# Patient Record
Sex: MALE | Race: BLACK OR AFRICAN AMERICAN | NOT HISPANIC OR LATINO | Employment: FULL TIME | URBAN - METROPOLITAN AREA
[De-identification: names, ages, dates, MRNs, and addresses within clinical notes are randomized per-mention and may not be internally consistent; named-entity substitution may affect disease eponyms.]

---

## 2021-07-08 ENCOUNTER — APPOINTMENT (OUTPATIENT)
Dept: GENERAL RADIOLOGY | Facility: HOSPITAL | Age: 66
End: 2021-07-08

## 2021-07-08 PROCEDURE — 73560 X-RAY EXAM OF KNEE 1 OR 2: CPT | Performed by: FAMILY MEDICINE

## 2024-12-23 ENCOUNTER — HOSPITAL ENCOUNTER (EMERGENCY)
Facility: HOSPITAL | Age: 69
Discharge: HOME | End: 2024-12-23
Attending: EMERGENCY MEDICINE | Admitting: EMERGENCY MEDICINE
Payer: MEDICARE

## 2024-12-23 ENCOUNTER — APPOINTMENT (OUTPATIENT)
Dept: CT IMAGING | Facility: HOSPITAL | Age: 69
End: 2024-12-23
Payer: MEDICARE

## 2024-12-23 VITALS
OXYGEN SATURATION: 94 % | RESPIRATION RATE: 16 BRPM | WEIGHT: 185 LBS | HEIGHT: 66 IN | DIASTOLIC BLOOD PRESSURE: 91 MMHG | HEART RATE: 61 BPM | BODY MASS INDEX: 29.73 KG/M2 | SYSTOLIC BLOOD PRESSURE: 166 MMHG | TEMPERATURE: 97.2 F

## 2024-12-23 DIAGNOSIS — R42 DIZZY: Primary | ICD-10-CM

## 2024-12-23 DIAGNOSIS — R42 VERTIGO: ICD-10-CM

## 2024-12-23 LAB
ALBUMIN SERPL-MCNC: 4.5 G/DL (ref 3.5–5.2)
ALBUMIN/GLOB SERPL: 1.2 G/DL
ALP SERPL-CCNC: 88 U/L (ref 39–117)
ALT SERPL W P-5'-P-CCNC: 31 U/L (ref 1–41)
ANION GAP SERPL CALCULATED.3IONS-SCNC: 9.1 MMOL/L (ref 5–15)
AST SERPL-CCNC: 17 U/L (ref 1–40)
BASOPHILS # BLD AUTO: 0.02 10*3/MM3 (ref 0–0.2)
BASOPHILS NFR BLD AUTO: 0.2 % (ref 0–1.5)
BILIRUB SERPL-MCNC: 0.4 MG/DL (ref 0–1.2)
BUN SERPL-MCNC: 17 MG/DL (ref 8–23)
BUN/CREAT SERPL: 15.9 (ref 7–25)
CALCIUM SPEC-SCNC: 9.4 MG/DL (ref 8.6–10.5)
CHLORIDE SERPL-SCNC: 103 MMOL/L (ref 98–107)
CO2 SERPL-SCNC: 26.9 MMOL/L (ref 22–29)
CREAT SERPL-MCNC: 1.07 MG/DL (ref 0.76–1.27)
D DIMER PPP FEU-MCNC: 0.34 MCGFEU/ML (ref 0–0.7)
DEPRECATED RDW RBC AUTO: 39.9 FL (ref 37–54)
EGFRCR SERPLBLD CKD-EPI 2021: 74.7 ML/MIN/1.73
EOSINOPHIL # BLD AUTO: 0.04 10*3/MM3 (ref 0–0.4)
EOSINOPHIL NFR BLD AUTO: 0.3 % (ref 0.3–6.2)
ERYTHROCYTE [DISTWIDTH] IN BLOOD BY AUTOMATED COUNT: 11.7 % (ref 12.3–15.4)
GEN 5 1HR TROPONIN T REFLEX: 9 NG/L
GLOBULIN UR ELPH-MCNC: 3.8 GM/DL
GLUCOSE SERPL-MCNC: 141 MG/DL (ref 65–99)
HCT VFR BLD AUTO: 53.3 % (ref 37.5–51)
HGB BLD-MCNC: 16.6 G/DL (ref 13–17.7)
IMM GRANULOCYTES # BLD AUTO: 0.02 10*3/MM3 (ref 0–0.05)
IMM GRANULOCYTES NFR BLD AUTO: 0.2 % (ref 0–0.5)
LYMPHOCYTES # BLD AUTO: 1.77 10*3/MM3 (ref 0.7–3.1)
LYMPHOCYTES NFR BLD AUTO: 14.4 % (ref 19.6–45.3)
MCH RBC QN AUTO: 28.7 PG (ref 26.6–33)
MCHC RBC AUTO-ENTMCNC: 31.1 G/DL (ref 31.5–35.7)
MCV RBC AUTO: 92.2 FL (ref 79–97)
MONOCYTES # BLD AUTO: 0.64 10*3/MM3 (ref 0.1–0.9)
MONOCYTES NFR BLD AUTO: 5.2 % (ref 5–12)
NEUTROPHILS NFR BLD AUTO: 79.7 % (ref 42.7–76)
NEUTROPHILS NFR BLD AUTO: 9.78 10*3/MM3 (ref 1.7–7)
PLATELET # BLD AUTO: 233 10*3/MM3 (ref 140–450)
PMV BLD AUTO: 11.4 FL (ref 6–12)
POTASSIUM SERPL-SCNC: 3.8 MMOL/L (ref 3.5–5.2)
PROT SERPL-MCNC: 8.3 G/DL (ref 6–8.5)
QT INTERVAL: 408 MS
QTC INTERVAL: 411 MS
RBC # BLD AUTO: 5.78 10*6/MM3 (ref 4.14–5.8)
SODIUM SERPL-SCNC: 139 MMOL/L (ref 136–145)
TROPONIN T NUMERIC DELTA: 1 NG/L
TROPONIN T SERPL HS-MCNC: 8 NG/L
WBC NRBC COR # BLD AUTO: 12.27 10*3/MM3 (ref 3.4–10.8)

## 2024-12-23 PROCEDURE — 85025 COMPLETE CBC W/AUTO DIFF WBC: CPT

## 2024-12-23 PROCEDURE — 80053 COMPREHEN METABOLIC PANEL: CPT | Performed by: EMERGENCY MEDICINE

## 2024-12-23 PROCEDURE — 96374 THER/PROPH/DIAG INJ IV PUSH: CPT

## 2024-12-23 PROCEDURE — 85379 FIBRIN DEGRADATION QUANT: CPT | Performed by: EMERGENCY MEDICINE

## 2024-12-23 PROCEDURE — 70450 CT HEAD/BRAIN W/O DYE: CPT

## 2024-12-23 PROCEDURE — 25010000002 ONDANSETRON PER 1 MG: Performed by: EMERGENCY MEDICINE

## 2024-12-23 PROCEDURE — 84484 ASSAY OF TROPONIN QUANT: CPT | Performed by: EMERGENCY MEDICINE

## 2024-12-23 PROCEDURE — 93005 ELECTROCARDIOGRAM TRACING: CPT | Performed by: EMERGENCY MEDICINE

## 2024-12-23 PROCEDURE — 36415 COLL VENOUS BLD VENIPUNCTURE: CPT

## 2024-12-23 PROCEDURE — 99284 EMERGENCY DEPT VISIT MOD MDM: CPT

## 2024-12-23 RX ORDER — ONDANSETRON 4 MG/1
4 TABLET, ORALLY DISINTEGRATING ORAL EVERY 6 HOURS PRN
Qty: 20 TABLET | Refills: 0 | Status: SHIPPED | OUTPATIENT
Start: 2024-12-23

## 2024-12-23 RX ORDER — MECLIZINE HYDROCHLORIDE 25 MG/1
25 TABLET ORAL EVERY 6 HOURS PRN
Qty: 30 TABLET | Refills: 0 | Status: SHIPPED | OUTPATIENT
Start: 2024-12-23

## 2024-12-23 RX ORDER — MECLIZINE HYDROCHLORIDE 25 MG/1
25 TABLET ORAL ONCE
Status: COMPLETED | OUTPATIENT
Start: 2024-12-23 | End: 2024-12-23

## 2024-12-23 RX ORDER — DEXAMETHASONE 4 MG/1
4 TABLET ORAL
Qty: 5 TABLET | Refills: 0 | Status: SHIPPED | OUTPATIENT
Start: 2024-12-23 | End: 2024-12-28

## 2024-12-23 RX ORDER — ONDANSETRON 2 MG/ML
4 INJECTION INTRAMUSCULAR; INTRAVENOUS ONCE
Status: COMPLETED | OUTPATIENT
Start: 2024-12-23 | End: 2024-12-23

## 2024-12-23 RX ADMIN — MECLIZINE HYDROCHLORIDE 25 MG: 25 TABLET ORAL at 12:26

## 2024-12-23 RX ADMIN — ONDANSETRON 4 MG: 2 INJECTION, SOLUTION INTRAMUSCULAR; INTRAVENOUS at 12:26

## 2024-12-23 NOTE — DISCHARGE INSTRUCTIONS
Today the CAT scan of your head does not reveal any evidence of a stroke.    Your laboratory evaluation likewise is reassuring with no evidence of a heart attack or any other acute abnormality    I do think you have vertigo.  I did send in appropriate medicine that should help improve your symptoms    Return at any time should your symptoms acutely worsen or do not improve    Please read all of the instructions in this handout.  If you receive prescriptions please fill them and take them as directed.  Please call your primary care physician for follow-up appointment in the next 5 to 7 days.  If you do not have a physician you may call the [facility] referral line at [phone].    You may return to the emergency department at any time for any concerns such as worsening symptoms.  If you received a work or school note it will be printed at the back of this packet.

## 2024-12-23 NOTE — [facility] PROVIDER NOTE
EMERGENCY DEPARTMENT ENCOUNTER    Room Number:  01/01  Date seen:  12/23/2024  Time seen: 12:21 EST  PCP: Kristian Winn MD  Historian:     Discussed/obtained information from independent historians:     HPI:    The patient is a 69-year-old male.  He went to bed last night at 0130 in normal state of health.  He woke this morning around 5:30 AM with symptoms of vertigo.  Specifically states the room was spinning.  Some nausea.  No headache, no focal motor or sensory deficits in his extremities.  Gait is intact.  No trauma.  No associated respiratory symptoms    External (non-ED) record review:        Chronic or social conditions impacting care:    ALLERGIES  Shellfish-derived products    PAST MEDICAL HISTORY  Active Ambulatory Problems     Diagnosis Date Noted    No Active Ambulatory Problems     Resolved Ambulatory Problems     Diagnosis Date Noted    No Resolved Ambulatory Problems     Past Medical History:   Diagnosis Date    Asthma        PAST SURGICAL HISTORY  History reviewed. No pertinent surgical history.    FAMILY HISTORY  Family History   Problem Relation Age of Onset    Diabetes Father     Glaucoma Other        SOCIAL HISTORY  Social History     Socioeconomic History    Marital status: Single   Tobacco Use    Smoking status: Never   Substance and Sexual Activity    Alcohol use: Never       REVIEW OF SYSTEMS  Review of Systems    All systems reviewed and negative except for those discussed in HPI.       PHYSICAL EXAM    I have reviewed the triage vital signs and nursing notes.  Vitals:    12/23/24 1312   BP: 158/95   Pulse: 69   Resp: 16   Temp:    SpO2: 97%     Physical Exam    Vital signs: Reviewed in nurses notes    General: Patient is awake alert no acute distress    HEENT: Normocephalic atraumatic nasopharynx is clear.  Oropharynx is clear and moist    Neck:   Supple, no elevation JVD, no bruits noted on either side    Respiratory:   Nonlabored respirations.  Clear to auscultation  bilaterally.  Equal breath sounds bilaterally.  No wheezes or stridor noted.    Cardiovascular: Regular rate and rhythm.  No pretibial or pedal edema    Abdomen: Soft nondistended nontender    Skin:   Warm and dry.  No rashes noted    Neurological examination: Patient is awake alert oriented x 4.  Speech is intact and normal.  No facial palsy.  5 out of 5 strength x 4 extremities.  Normal finger-to-nose bilaterally.  Gait is intact and normal.  NIH equals 0  LAB RESULTS  Recent Results (from the past 24 hours)   D-dimer, Quantitative    Collection Time: 12/23/24 11:53 AM    Specimen: Blood   Result Value Ref Range    D-Dimer, Quantitative 0.34 0.00 - 0.70 MCGFEU/mL   Comprehensive Metabolic Panel    Collection Time: 12/23/24 11:53 AM    Specimen: Blood   Result Value Ref Range    Glucose 141 (H) 65 - 99 mg/dL    BUN 17 8 - 23 mg/dL    Creatinine 1.07 0.76 - 1.27 mg/dL    Sodium 139 136 - 145 mmol/L    Potassium 3.8 3.5 - 5.2 mmol/L    Chloride 103 98 - 107 mmol/L    CO2 26.9 22.0 - 29.0 mmol/L    Calcium 9.4 8.6 - 10.5 mg/dL    Total Protein 8.3 6.0 - 8.5 g/dL    Albumin 4.5 3.5 - 5.2 g/dL    ALT (SGPT) 31 1 - 41 U/L    AST (SGOT) 17 1 - 40 U/L    Alkaline Phosphatase 88 39 - 117 U/L    Total Bilirubin 0.4 0.0 - 1.2 mg/dL    Globulin 3.8 gm/dL    A/G Ratio 1.2 g/dL    BUN/Creatinine Ratio 15.9 7.0 - 25.0    Anion Gap 9.1 5.0 - 15.0 mmol/L    eGFR 74.7 >60.0 mL/min/1.73   CBC Auto Differential    Collection Time: 12/23/24 11:53 AM    Specimen: Blood   Result Value Ref Range    WBC 12.27 (H) 3.40 - 10.80 10*3/mm3    RBC 5.78 4.14 - 5.80 10*6/mm3    Hemoglobin 16.6 13.0 - 17.7 g/dL    Hematocrit 53.3 (H) 37.5 - 51.0 %    MCV 92.2 79.0 - 97.0 fL    MCH 28.7 26.6 - 33.0 pg    MCHC 31.1 (L) 31.5 - 35.7 g/dL    RDW 11.7 (L) 12.3 - 15.4 %    RDW-SD 39.9 37.0 - 54.0 fl    MPV 11.4 6.0 - 12.0 fL    Platelets 233 140 - 450 10*3/mm3    Neutrophil % 79.7 (H) 42.7 - 76.0 %    Lymphocyte % 14.4 (L) 19.6 - 45.3 %    Monocyte % 5.2  5.0 - 12.0 %    Eosinophil % 0.3 0.3 - 6.2 %    Basophil % 0.2 0.0 - 1.5 %    Immature Grans % 0.2 0.0 - 0.5 %    Neutrophils, Absolute 9.78 (H) 1.70 - 7.00 10*3/mm3    Lymphocytes, Absolute 1.77 0.70 - 3.10 10*3/mm3    Monocytes, Absolute 0.64 0.10 - 0.90 10*3/mm3    Eosinophils, Absolute 0.04 0.00 - 0.40 10*3/mm3    Basophils, Absolute 0.02 0.00 - 0.20 10*3/mm3    Immature Grans, Absolute 0.02 0.00 - 0.05 10*3/mm3   High Sensitivity Troponin T    Collection Time: 12/23/24 11:53 AM    Specimen: Blood   Result Value Ref Range    HS Troponin T 8 <22 ng/L   ECG 12 Lead Syncope    Collection Time: 12/23/24 11:57 AM   Result Value Ref Range    QT Interval 408 ms    QTC Interval 411 ms   High Sensitivity Troponin T 1Hr    Collection Time: 12/23/24  1:08 PM    Specimen: Blood   Result Value Ref Range    HS Troponin T 9 <22 ng/L    Troponin T Numeric Delta 1 Abnormal if >/=3 ng/L       Ordered the above labs and independently interpreted results.  My findings will be discussed in the ED course or medical decision making section below      PROCEDURES:  ECG 12 Lead      Date/Time: 12/23/2024 11:57 AM    Performed by: Kashif Rahman MD  Authorized by: Kashif Rahman MD  Rhythm: sinus rhythm  Comments: Normal sinus rhythm rate of 61.  Parenterally progression noted.  No acute ST elevation or depression.  There are some anterior lateral T wave inversions noted, no STEMI          RADIOLOGY RESULTS  CT Head Without Contrast    Result Date: 12/23/2024  CT HEAD WO CONTRAST-  HISTORY:  dizz  COMPARISON: None  FINDINGS: The brain ventricles are symmetrical. There is no evidence of hemorrhage, hydrocephalus or of acute infarction.      No acute process is identified. Further evaluation could be performed with MRI examination brain as indicated.      Radiation dose reduction techniques were utilized, including automated exposure modulation based on body size.  This report was finalized on 12/23/2024 12:36 PM by Dr. Spears  PAULA Crenshaw on Workstation: [id number]        Ordered the above noted radiological studies.  Independently interpreted by me.  My findings will be discussed in the medical decision section below.     PROGRESS, DATA ANALYSIS, CONSULTS AND MEDICAL DECISION MAKING    Please note that this section constitutes my independent interpretation of clinical data including lab results, radiology, EKG's.  This constitutes my independent professional opinion regarding differential diagnosis and management of this patient.  It may include any factors such as history from outside sources, review of external records, social determinants of health, management of medications, response to those treatments, and discussions with other providers.    ED Course as of 12/23/24 1411   Mon Dec 23, 2024   1409 On repeat exam patient is walking and is improved.  When he keeps his head very still he walks without any difficulty.  He still has some dizziness upon head movement [TC]      ED Course User Index  [TC] Kashif Rahman MD     Orders placed during this visit:  Orders Placed This Encounter   Procedures    CT Head Without Contrast    D-dimer, Quantitative    Comprehensive Metabolic Panel    CBC Auto Differential    High Sensitivity Troponin T    High Sensitivity Troponin T 1Hr    ECG 12 Lead Syncope    ECG 12 Lead    CBC & Differential    ED Acknowledgement Form Needed;       Medications   ondansetron (ZOFRAN) injection 4 mg (4 mg Intravenous Given 12/23/24 1226)   meclizine (ANTIVERT) tablet 25 mg (25 mg Oral Given 12/23/24 1226)            Medical Decision Making          DIAGNOSIS  Final diagnoses:   Dizzy   Vertigo          Medication List        New Prescriptions      dexAMETHasone 4 MG tablet  Commonly known as: DECADRON  Take 1 tablet by mouth Daily With Breakfast for 5 days.     meclizine 25 MG tablet  Commonly known as: ANTIVERT  Take 1 tablet by mouth Every 6 (Six) Hours As Needed for Dizziness.     ondansetron ODT 4 MG  disintegrating tablet  Commonly known as: ZOFRAN-ODT  Place 1 tablet on the tongue Every 6 (Six) Hours As Needed for Vomiting or Nausea.               Where to Get Your Medications        These medications were sent to [id number] - [place], [place] - [address] AT [address] - [phone] PH - [phone] FX  [address], [facility] [address]      Phone: [phone]   dexAMETHasone 4 MG tablet  meclizine 25 MG tablet  ondansetron ODT 4 MG disintegrating tablet         FOLLOW-UP  Kristian Winn MD  [id number]  [facility] [address] [phone]    In 1 week          Latest Documented Vital Signs:  As of 14:11 EST  BP- 158/95 HR- 69 Temp- 97.2 °F (36.2 °C) (Oral) O2 sat- 97%    Appropriate PPE utilized throughout this patient encounter to include mask, if indicated, per current protocol. Hand hygiene was performed before donning PPE and after removal when leaving the room.    Please note that portions of this were completed with a voice recognition program.     Note Disclaimer: At [facility], we believe that sharing information builds trust and better relationships. You are receiving this note because you are receiving care at [facility] or recently visited. It is possible you will see health information before a provider has talked with you about it. This kind of information can be easy to misunderstand. To help you fully understand what it means for your health, we urge you to discuss this note with your provider.